# Patient Record
Sex: FEMALE | Race: BLACK OR AFRICAN AMERICAN | NOT HISPANIC OR LATINO | URBAN - METROPOLITAN AREA
[De-identification: names, ages, dates, MRNs, and addresses within clinical notes are randomized per-mention and may not be internally consistent; named-entity substitution may affect disease eponyms.]

---

## 2023-11-10 ENCOUNTER — OFFICE (OUTPATIENT)
Dept: URBAN - METROPOLITAN AREA CLINIC 11 | Facility: CLINIC | Age: 68
End: 2023-11-10

## 2023-11-10 VITALS
DIASTOLIC BLOOD PRESSURE: 95 MMHG | SYSTOLIC BLOOD PRESSURE: 157 MMHG | OXYGEN SATURATION: 92 % | WEIGHT: 174 LBS | HEIGHT: 66 IN | HEART RATE: 82 BPM

## 2023-11-10 DIAGNOSIS — Z80.0 FAMILY HISTORY OF MALIGNANT NEOPLASM OF DIGESTIVE ORGANS: ICD-10-CM

## 2023-11-10 DIAGNOSIS — K21.9 GASTRO-ESOPHAGEAL REFLUX DISEASE WITHOUT ESOPHAGITIS: ICD-10-CM

## 2023-11-10 DIAGNOSIS — K59.00 CONSTIPATION, UNSPECIFIED: ICD-10-CM

## 2023-11-10 DIAGNOSIS — Z86.010 PERSONAL HISTORY OF COLONIC POLYPS: ICD-10-CM

## 2023-11-10 DIAGNOSIS — K92.1 MELENA: ICD-10-CM

## 2023-11-10 PROCEDURE — 99204 OFFICE O/P NEW MOD 45 MIN: CPT | Performed by: STUDENT IN AN ORGANIZED HEALTH CARE EDUCATION/TRAINING PROGRAM

## 2023-11-10 RX ORDER — SODIUM PICOSULFATE, MAGNESIUM OXIDE, AND ANHYDROUS CITRIC ACID 10; 3.5; 12 MG/160ML; G/160ML; G/160ML
LIQUID ORAL
Qty: 350 | Refills: 0 | Status: COMPLETED
Start: 2023-11-10 | End: 2023-11-21

## 2023-11-10 NOTE — SERVICEHPINOTES
Ms. Aishwarya Freeman is a 68-year-old female with a past medical history of hypertension, GERD, neuropathy, who presents to gastro 1 for hematochezia.
br
br   clinic visit 11/10/2023: 
br Patient reports that she has been struggling with worsening constipation lately with associated straining.  She had an episode of hematochezia after she gave herself an enema 2 weeks ago and had significant passage of oscar blood.  She normally has 1 bowel movement every 3 days but she has recently started taking docusate which does help  soften her stools.  When she is not having regular bowel movements he has associated bloating.  She takes omeprazole once a day for the last several years with good control of her acid reflux.  She is not taking any NSAIDs.  Her last colonoscopy was in 2013 and 1 tubular adenoma and sessile serrated adenoma were removed and repeat was recommended in 5 years but she has been lost to follow-up.  She has a family history of recently diagnosed colon cancer in her brother at age 64 and her sister recently found some precancerous polyps.  She does not smoke, drink alcohol, or use recreational drugs.  Previous abdominal surgeries include hysterectomy and myomectomy.

## 2023-11-10 NOTE — SERVICENOTES
Patient has new onset hematochezia for the last couple of weeks with associated constipation and straining so it is likely that she may have symptomatic hemorrhoids but we need to rule out other malignant causes since she is overdue for colonoscopy.  Will schedule her for diagnostic colonoscopy.  I encouraged her to increase the fiber in her diet and start MiraLax once a day.  She can continue taking docusate over-the-counter.  I discussed with her the risks and benefits of the procedure including bleeding, infection, anesthesia related complications.  Patient agrees proceed with the planned procedure.  All questions addressed.  She can continue taking omeprazole once a day with good control of her acid reflux.  Avoid NSAIDs.  All questions addressed. I personally reviewed the patient's previous medical records for her visit today.

## 2023-11-21 ENCOUNTER — OFFICE (OUTPATIENT)
Dept: URBAN - METROPOLITAN AREA PATHOLOGY 12 | Facility: PATHOLOGY | Age: 68
End: 2023-11-21
Payer: MEDICARE

## 2023-11-21 ENCOUNTER — AMBULATORY SURGICAL CENTER (OUTPATIENT)
Dept: URBAN - METROPOLITAN AREA SURGERY 2 | Facility: SURGERY | Age: 68
End: 2023-11-21
Payer: MEDICARE

## 2023-11-21 VITALS
WEIGHT: 173 LBS | DIASTOLIC BLOOD PRESSURE: 93 MMHG | DIASTOLIC BLOOD PRESSURE: 64 MMHG | DIASTOLIC BLOOD PRESSURE: 70 MMHG | HEART RATE: 62 BPM | OXYGEN SATURATION: 100 % | SYSTOLIC BLOOD PRESSURE: 125 MMHG | HEART RATE: 72 BPM | SYSTOLIC BLOOD PRESSURE: 125 MMHG | HEIGHT: 66 IN | HEART RATE: 82 BPM | TEMPERATURE: 98.2 F | SYSTOLIC BLOOD PRESSURE: 136 MMHG | DIASTOLIC BLOOD PRESSURE: 70 MMHG | HEART RATE: 72 BPM | TEMPERATURE: 98.3 F | SYSTOLIC BLOOD PRESSURE: 158 MMHG | DIASTOLIC BLOOD PRESSURE: 64 MMHG | OXYGEN SATURATION: 99 % | DIASTOLIC BLOOD PRESSURE: 64 MMHG | OXYGEN SATURATION: 99 % | SYSTOLIC BLOOD PRESSURE: 119 MMHG | HEART RATE: 62 BPM | HEART RATE: 82 BPM | RESPIRATION RATE: 16 BRPM | SYSTOLIC BLOOD PRESSURE: 125 MMHG | SYSTOLIC BLOOD PRESSURE: 119 MMHG | RESPIRATION RATE: 16 BRPM | OXYGEN SATURATION: 98 % | SYSTOLIC BLOOD PRESSURE: 158 MMHG | WEIGHT: 173 LBS | HEIGHT: 66 IN | TEMPERATURE: 98.3 F | DIASTOLIC BLOOD PRESSURE: 73 MMHG | DIASTOLIC BLOOD PRESSURE: 73 MMHG | DIASTOLIC BLOOD PRESSURE: 68 MMHG | SYSTOLIC BLOOD PRESSURE: 139 MMHG | RESPIRATION RATE: 16 BRPM | HEART RATE: 61 BPM | HEART RATE: 61 BPM | HEIGHT: 66 IN | DIASTOLIC BLOOD PRESSURE: 93 MMHG | DIASTOLIC BLOOD PRESSURE: 93 MMHG | OXYGEN SATURATION: 99 % | DIASTOLIC BLOOD PRESSURE: 73 MMHG | TEMPERATURE: 98.2 F | TEMPERATURE: 98.3 F | HEART RATE: 72 BPM | OXYGEN SATURATION: 98 % | HEART RATE: 61 BPM | WEIGHT: 173 LBS | OXYGEN SATURATION: 98 % | SYSTOLIC BLOOD PRESSURE: 136 MMHG | SYSTOLIC BLOOD PRESSURE: 139 MMHG | SYSTOLIC BLOOD PRESSURE: 139 MMHG | SYSTOLIC BLOOD PRESSURE: 119 MMHG | OXYGEN SATURATION: 100 % | OXYGEN SATURATION: 100 % | SYSTOLIC BLOOD PRESSURE: 158 MMHG | SYSTOLIC BLOOD PRESSURE: 136 MMHG | HEART RATE: 62 BPM | DIASTOLIC BLOOD PRESSURE: 68 MMHG | DIASTOLIC BLOOD PRESSURE: 70 MMHG | HEART RATE: 82 BPM | TEMPERATURE: 98.2 F | DIASTOLIC BLOOD PRESSURE: 68 MMHG

## 2023-11-21 DIAGNOSIS — K63.5 POLYP OF COLON: ICD-10-CM

## 2023-11-21 DIAGNOSIS — D12.5 BENIGN NEOPLASM OF SIGMOID COLON: ICD-10-CM

## 2023-11-21 DIAGNOSIS — K57.30 DIVERTICULOSIS OF LARGE INTESTINE WITHOUT PERFORATION OR ABS: ICD-10-CM

## 2023-11-21 DIAGNOSIS — D12.3 BENIGN NEOPLASM OF TRANSVERSE COLON: ICD-10-CM

## 2023-11-21 DIAGNOSIS — D12.2 BENIGN NEOPLASM OF ASCENDING COLON: ICD-10-CM

## 2023-11-21 DIAGNOSIS — K92.1 MELENA: ICD-10-CM

## 2023-11-21 PROCEDURE — 88305 TISSUE EXAM BY PATHOLOGIST: CPT | Mod: TC | Performed by: STUDENT IN AN ORGANIZED HEALTH CARE EDUCATION/TRAINING PROGRAM

## 2023-11-21 PROCEDURE — 45385 COLONOSCOPY W/LESION REMOVAL: CPT | Performed by: STUDENT IN AN ORGANIZED HEALTH CARE EDUCATION/TRAINING PROGRAM

## 2023-11-21 PROCEDURE — 45380 COLONOSCOPY AND BIOPSY: CPT | Mod: 59 | Performed by: STUDENT IN AN ORGANIZED HEALTH CARE EDUCATION/TRAINING PROGRAM

## 2023-11-21 PROCEDURE — 45381 COLONOSCOPY SUBMUCOUS NJX: CPT | Mod: 51 | Performed by: STUDENT IN AN ORGANIZED HEALTH CARE EDUCATION/TRAINING PROGRAM

## 2023-11-21 NOTE — SERVICEHPINOTES
Ms. Aishwarya Freeman is a 68-year-old female with a past medical history of hypertension, GERD, neuropathy, who initially presented to gastro  for hematochezia.clinic visit 11/10/2023:brPatient reports that she has been struggling with worsening constipation lately with associated straining.  She had an episode of hematochezia after she gave herself an enema 2 weeks ago and had significant passage of oscar blood.  She normally has 1 bowel movement every 3 days but she has recently started taking docusate which does help  soften her stools.  When she is not having regular bowel movements he has associated bloating.  She takes omeprazole once a day for the last several years with good control of her acid reflux.  She is not taking any NSAIDs.  Her last colonoscopy was in 2013 and 1 tubular adenoma and sessile serrated adenoma were removed and repeat was recommended in 5 years but she has been lost to follow-up.  She has a family history of recently diagnosed colon cancer in her brother at age 64 and her sister recently found some precancerous polyps.  She does not smoke, drink alcohol, or use recreational drugs.  Previous abdominal surgeries include hysterectomy and myomectomy. 
br
br   Colonoscopy 11/21/23:  
br
Not on blood thinners, no further bleeding, brother has colon cancer. Ready for procedure today.

## 2023-11-21 NOTE — SERVICENOTES
scope in 6:50
cecum 7:02
scope out 7:25
tortuous colon, multiple polyps, with piecemeal removal will likely need repeat colonoscopy in 6 months, bleeding could have been hemorrhoidal vs diverticulosis

## 2023-11-27 LAB
GASTRO ONE PATHOLOGY: PDF REPORT: (no result)

## 2024-02-14 ENCOUNTER — OFFICE (OUTPATIENT)
Dept: URBAN - METROPOLITAN AREA CLINIC 11 | Facility: CLINIC | Age: 69
End: 2024-02-14

## 2024-02-14 VITALS
OXYGEN SATURATION: 91 % | WEIGHT: 172 LBS | HEART RATE: 78 BPM | SYSTOLIC BLOOD PRESSURE: 149 MMHG | HEIGHT: 66 IN | DIASTOLIC BLOOD PRESSURE: 93 MMHG

## 2024-02-14 DIAGNOSIS — K59.00 CONSTIPATION, UNSPECIFIED: ICD-10-CM

## 2024-02-14 DIAGNOSIS — K21.9 GASTRO-ESOPHAGEAL REFLUX DISEASE WITHOUT ESOPHAGITIS: ICD-10-CM

## 2024-02-14 DIAGNOSIS — Z86.010 PERSONAL HISTORY OF COLONIC POLYPS: ICD-10-CM

## 2024-02-14 PROCEDURE — 99214 OFFICE O/P EST MOD 30 MIN: CPT | Performed by: STUDENT IN AN ORGANIZED HEALTH CARE EDUCATION/TRAINING PROGRAM

## 2024-02-14 RX ORDER — LINACLOTIDE 145 UG/1
CAPSULE, GELATIN COATED ORAL
Qty: 90 | Refills: 3 | Status: COMPLETED
Start: 2024-02-14 | End: 2024-03-26

## 2024-02-14 RX ORDER — SODIUM PICOSULFATE, MAGNESIUM OXIDE, AND ANHYDROUS CITRIC ACID 10; 3.5; 12 MG/160ML; G/160ML; G/160ML
LIQUID ORAL
Qty: 350 | Refills: 0 | Status: COMPLETED
Start: 2024-02-14 | End: 2024-03-26

## 2024-02-14 NOTE — SERVICENOTES
Patient needs a repeat colonoscopy due to piecemeal resection of a large polyp on last exam so will go ahead and schedule her for that at this time.  Will continue omeprazole once a day for her acid reflux.  For her increased bloating and constipation despite over-the-counter measures I am going to start her on Linzess, samples given today.  Encourage high-fiber diet and will have her return to clinic after her procedure to see how the Linzess is working.  I discussed with the patient the risks and benefits of the procedure including bleeding, infection, anesthesia related complications.  Patient agrees to proceed with the planned procedure.  All questions addressed.

## 2024-02-14 NOTE — SERVICEHPINOTES
Ms. Aishwarya Freeman is a 68-year-old female with a past medical history of hypertension, GERD, neuropathy, who initially presented to gastro  for hematochezia.clinic visit 11/10/2023:brPatient reports that she has been struggling with worsening constipation lately with associated straining.  She had an episode of hematochezia after she gave herself an enema 2 weeks ago and had significant passage of oscar blood.  She normally has 1 bowel movement every 3 days but she has recently started taking docusate which does help  soften her stools.  When she is not having regular bowel movements he has associated bloating.  She takes omeprazole once a day for the last several years with good control of her acid reflux.  She is not taking any NSAIDs.  Her last colonoscopy was in  and 1 tubular adenoma and sessile serrated adenoma were removed and repeat was recommended in 5 years but she has been lost to follow-up.  She has a family history of recently diagnosed colon cancer in her brother at age 64 and her sister recently found some precancerous polyps.  She does not smoke, drink alcohol, or use recreational drugs.  Previous abdominal surgeries include hysterectomy and myomectomy.Colonoscopy 23:brNot on blood thinners, no further bleeding, brother has colon cancer. Ready for procedure today.br  
br Clinic visit 24:
br 
 since her colonoscopy the patient has had no more hematochezia but she still has some straining and bloating.  She is taking stool softeners over-the-counter twice a day as well as MiraLax but is still having about 1 bowel movement about once every 3 days.  She is on some narcotic medications just because she had a tooth pulled recently but she does not anticipate taking any more medication for pain since she is far out from her procedure.  She has does take Pregabalin and omeprazole.  She has good control of her acid reflux.  Her colonoscopy showed 3 small tubular adenomas and 1 large sessile serrated adenoma that was removed piecemeal in the ascending colon so repeat was recommended in 6 months.   Patient reports that since last visit her brother  of colon cancer.  He lived in [place] and was a former professional .

## 2024-02-16 LAB
CELIAC AB TTG DGP TIGA: DEAMIDATED GLIADIN ABS, IGA: 6 UNITS (ref 0–19)
CELIAC AB TTG DGP TIGA: DEAMIDATED GLIADIN ABS, IGG: 3 UNITS (ref 0–19)
CELIAC AB TTG DGP TIGA: IMMUNOGLOBULIN A, QN, SERUM: 153 MG/DL (ref 87–352)
CELIAC AB TTG DGP TIGA: T-TRANSGLUTAMINASE (TTG) IGA: <2 U/ML
CELIAC AB TTG DGP TIGA: T-TRANSGLUTAMINASE (TTG) IGG: 42 U/ML — HIGH (ref 0–5)
TSH: 2.04 UIU/ML (ref 0.45–4.5)

## 2024-03-26 ENCOUNTER — AMBULATORY SURGICAL CENTER (OUTPATIENT)
Dept: URBAN - METROPOLITAN AREA SURGERY 2 | Facility: SURGERY | Age: 69
End: 2024-03-26
Payer: MEDICARE

## 2024-03-26 ENCOUNTER — OFFICE (OUTPATIENT)
Dept: URBAN - METROPOLITAN AREA PATHOLOGY 12 | Facility: PATHOLOGY | Age: 69
End: 2024-03-26
Payer: MEDICARE

## 2024-03-26 VITALS
HEART RATE: 75 BPM | OXYGEN SATURATION: 99 % | TEMPERATURE: 98.3 F | DIASTOLIC BLOOD PRESSURE: 61 MMHG | HEIGHT: 66 IN | RESPIRATION RATE: 12 BRPM | SYSTOLIC BLOOD PRESSURE: 106 MMHG | WEIGHT: 168 LBS | HEART RATE: 77 BPM | HEART RATE: 74 BPM | SYSTOLIC BLOOD PRESSURE: 102 MMHG | SYSTOLIC BLOOD PRESSURE: 106 MMHG | SYSTOLIC BLOOD PRESSURE: 111 MMHG | HEIGHT: 66 IN | OXYGEN SATURATION: 95 % | RESPIRATION RATE: 16 BRPM | DIASTOLIC BLOOD PRESSURE: 61 MMHG | TEMPERATURE: 98.3 F | TEMPERATURE: 98.2 F | OXYGEN SATURATION: 100 % | HEART RATE: 75 BPM | RESPIRATION RATE: 14 BRPM | SYSTOLIC BLOOD PRESSURE: 129 MMHG | DIASTOLIC BLOOD PRESSURE: 58 MMHG | DIASTOLIC BLOOD PRESSURE: 60 MMHG | DIASTOLIC BLOOD PRESSURE: 61 MMHG | OXYGEN SATURATION: 96 % | SYSTOLIC BLOOD PRESSURE: 129 MMHG | OXYGEN SATURATION: 95 % | RESPIRATION RATE: 14 BRPM | SYSTOLIC BLOOD PRESSURE: 106 MMHG | RESPIRATION RATE: 15 BRPM | WEIGHT: 168 LBS | DIASTOLIC BLOOD PRESSURE: 89 MMHG | HEART RATE: 74 BPM | HEART RATE: 71 BPM | DIASTOLIC BLOOD PRESSURE: 58 MMHG | OXYGEN SATURATION: 94 % | SYSTOLIC BLOOD PRESSURE: 111 MMHG | OXYGEN SATURATION: 96 % | OXYGEN SATURATION: 100 % | HEART RATE: 77 BPM | HEART RATE: 75 BPM | DIASTOLIC BLOOD PRESSURE: 89 MMHG | RESPIRATION RATE: 12 BRPM | OXYGEN SATURATION: 94 % | DIASTOLIC BLOOD PRESSURE: 62 MMHG | DIASTOLIC BLOOD PRESSURE: 62 MMHG | DIASTOLIC BLOOD PRESSURE: 60 MMHG | TEMPERATURE: 98.2 F | RESPIRATION RATE: 16 BRPM | DIASTOLIC BLOOD PRESSURE: 89 MMHG | DIASTOLIC BLOOD PRESSURE: 62 MMHG | SYSTOLIC BLOOD PRESSURE: 129 MMHG | RESPIRATION RATE: 14 BRPM | HEART RATE: 74 BPM | HEART RATE: 71 BPM | OXYGEN SATURATION: 99 % | DIASTOLIC BLOOD PRESSURE: 58 MMHG | SYSTOLIC BLOOD PRESSURE: 102 MMHG | DIASTOLIC BLOOD PRESSURE: 60 MMHG | RESPIRATION RATE: 12 BRPM | RESPIRATION RATE: 15 BRPM | RESPIRATION RATE: 15 BRPM | OXYGEN SATURATION: 99 % | OXYGEN SATURATION: 95 % | TEMPERATURE: 98.3 F | WEIGHT: 168 LBS | HEART RATE: 71 BPM | OXYGEN SATURATION: 96 % | SYSTOLIC BLOOD PRESSURE: 102 MMHG | OXYGEN SATURATION: 94 % | RESPIRATION RATE: 16 BRPM | HEART RATE: 77 BPM | OXYGEN SATURATION: 100 % | SYSTOLIC BLOOD PRESSURE: 111 MMHG | HEIGHT: 66 IN | TEMPERATURE: 98.2 F

## 2024-03-26 DIAGNOSIS — Z80.0 FAMILY HISTORY OF MALIGNANT NEOPLASM OF DIGESTIVE ORGANS: ICD-10-CM

## 2024-03-26 DIAGNOSIS — R89.4 ABNORMAL IMMUNOLOGICAL FINDINGS IN SPECIMENS FROM OTHER ORGA: ICD-10-CM

## 2024-03-26 DIAGNOSIS — K31.7 POLYP OF STOMACH AND DUODENUM: ICD-10-CM

## 2024-03-26 DIAGNOSIS — Z09 ENCOUNTER FOR FOLLOW-UP EXAMINATION AFTER COMPLETED TREATMEN: ICD-10-CM

## 2024-03-26 DIAGNOSIS — D12.2 BENIGN NEOPLASM OF ASCENDING COLON: ICD-10-CM

## 2024-03-26 DIAGNOSIS — K57.30 DIVERTICULOSIS OF LARGE INTESTINE WITHOUT PERFORATION OR ABS: ICD-10-CM

## 2024-03-26 DIAGNOSIS — K31.89 OTHER DISEASES OF STOMACH AND DUODENUM: ICD-10-CM

## 2024-03-26 DIAGNOSIS — K63.5 POLYP OF COLON: ICD-10-CM

## 2024-03-26 DIAGNOSIS — K21.9 GASTRO-ESOPHAGEAL REFLUX DISEASE WITHOUT ESOPHAGITIS: ICD-10-CM

## 2024-03-26 DIAGNOSIS — K44.9 DIAPHRAGMATIC HERNIA WITHOUT OBSTRUCTION OR GANGRENE: ICD-10-CM

## 2024-03-26 DIAGNOSIS — Z86.010 PERSONAL HISTORY OF COLONIC POLYPS: ICD-10-CM

## 2024-03-26 PROCEDURE — 88313 SPECIAL STAINS GROUP 2: CPT | Performed by: STUDENT IN AN ORGANIZED HEALTH CARE EDUCATION/TRAINING PROGRAM

## 2024-03-26 PROCEDURE — 45380 COLONOSCOPY AND BIOPSY: CPT | Performed by: STUDENT IN AN ORGANIZED HEALTH CARE EDUCATION/TRAINING PROGRAM

## 2024-03-26 PROCEDURE — 88305 TISSUE EXAM BY PATHOLOGIST: CPT | Performed by: STUDENT IN AN ORGANIZED HEALTH CARE EDUCATION/TRAINING PROGRAM

## 2024-03-26 PROCEDURE — 88341 IMHCHEM/IMCYTCHM EA ADD ANTB: CPT | Performed by: STUDENT IN AN ORGANIZED HEALTH CARE EDUCATION/TRAINING PROGRAM

## 2024-03-26 PROCEDURE — 43239 EGD BIOPSY SINGLE/MULTIPLE: CPT | Mod: 51 | Performed by: STUDENT IN AN ORGANIZED HEALTH CARE EDUCATION/TRAINING PROGRAM

## 2024-03-26 PROCEDURE — 88342 IMHCHEM/IMCYTCHM 1ST ANTB: CPT | Performed by: STUDENT IN AN ORGANIZED HEALTH CARE EDUCATION/TRAINING PROGRAM

## 2024-03-26 NOTE — SERVICEHPINOTES
Ms. Aishwarya Freeman is a 69-year-old female with a past medical history of hypertension, GERD, neuropathy, who initially presented to [place] [id number] for hematochezia.clinic visit 11/10/2023:brPatient reports that she has been struggling with worsening constipation lately with associated straining.  She had an episode of hematochezia after she gave herself an enema 2 weeks ago and had significant passage of oscar blood.  She normally has 1 bowel movement every 3 days but she has recently started taking docusate which does help  soften her stools.  When she is not having regular bowel movements he has associated bloating.  She takes omeprazole once a day for the last several years with good control of her acid reflux.  She is not taking any NSAIDs.  Her last colonoscopy was in  and 1 tubular adenoma and sessile serrated adenoma were removed and repeat was recommended in 5 years but she has been lost to follow-up.  She has a family history of recently diagnosed colon cancer in her brother at age 64 and her sister recently found some precancerous polyps.  She does not smoke, drink alcohol, or use recreational drugs.  Previous abdominal surgeries include hysterectomy and myomectomy.Colonoscopy 23:brNot on blood thinners, no further bleeding, brother has colon cancer. Ready for procedure today.Clinic visit 24:br since her colonoscopy the patient has had no more hematochezia but she still has some straining and bloating.  She is taking stool softeners over-the-counter twice a day as well as MiraLax but is still having about 1 bowel movement about once every 3 days.  She is on some narcotic medications just because she had a tooth pulled recently but she does not anticipate taking any more medication for pain since she is far out from her procedure.  She has does take Pregabalin and omeprazole.  She has good control of her acid reflux.  Her colonoscopy showed 3 small tubular adenomas and 1 large sessile serrated adenoma that was removed piecemeal in the ascending colon so repeat was recommended in 6 months.   Patient reports that since last visit her brother  of colon cancer.  He lived in [place] and was a former professional .
br
br   EGD/colonoscopy 3/26/24:  
br
still has some bloating, celiac serologies equivocal, last BM was clear, not on blood thinners.

## 2024-03-26 NOTE — SERVICEHPINOTES
Ms. Aishwarya Freeman is a 69-year-old female with a past medical history of hypertension, GERD, neuropathy, who initially presented to [place] [address] for hematochezia.clinic visit 11/10/2023:brPatient reports that she has been struggling with worsening constipation lately with associated straining.  She had an episode of hematochezia after she gave herself an enema 2 weeks ago and had significant passage of oscar blood.  She normally has 1 bowel movement every 3 days but she has recently started taking docusate which does help  soften her stools.  When she is not having regular bowel movements he has associated bloating.  She takes omeprazole once a day for the last several years with good control of her acid reflux.  She is not taking any NSAIDs.  Her last colonoscopy was in  and 1 tubular adenoma and sessile serrated adenoma were removed and repeat was recommended in 5 years but she has been lost to follow-up.  She has a family history of recently diagnosed colon cancer in her brother at age 64 and her sister recently found some precancerous polyps.  She does not smoke, drink alcohol, or use recreational drugs.  Previous abdominal surgeries include hysterectomy and myomectomy.Colonoscopy 23:brNot on blood thinners, no further bleeding, brother has colon cancer. Ready for procedure today.Clinic visit 24:br since her colonoscopy the patient has had no more hematochezia but she still has some straining and bloating.  She is taking stool softeners over-the-counter twice a day as well as MiraLax but is still having about 1 bowel movement about once every 3 days.  She is on some narcotic medications just because she had a tooth pulled recently but she does not anticipate taking any more medication for pain since she is far out from her procedure.  She has does take Pregabalin and omeprazole.  She has good control of her acid reflux.  Her colonoscopy showed 3 small tubular adenomas and 1 large sessile serrated adenoma that was removed piecemeal in the ascending colon so repeat was recommended in 6 months.   Patient reports that since last visit her brother  of colon cancer.  He lived in [place] and was a former professional .
br
br   EGD/colonoscopy 3/26/24:  
br
still has some bloating, celiac serologies equivocal, last BM was clear, not on blood thinners.

## 2024-03-26 NOTE — SERVICEHPINOTES
Ms. Aishwarya Freeman is a 69-year-old female with a past medical history of hypertension, GERD, neuropathy, who initially presented to [place] [address] for hematochezia.clinic visit 11/10/2023:brPatient reports that she has been struggling with worsening constipation lately with associated straining.  She had an episode of hematochezia after she gave herself an enema 2 weeks ago and had significant passage of oscar blood.  She normally has 1 bowel movement every 3 days but she has recently started taking docusate which does help  soften her stools.  When she is not having regular bowel movements he has associated bloating.  She takes omeprazole once a day for the last several years with good control of her acid reflux.  She is not taking any NSAIDs.  Her last colonoscopy was in  and 1 tubular adenoma and sessile serrated adenoma were removed and repeat was recommended in 5 years but she has been lost to follow-up.  She has a family history of recently diagnosed colon cancer in her brother at age 64 and her sister recently found some precancerous polyps.  She does not smoke, drink alcohol, or use recreational drugs.  Previous abdominal surgeries include hysterectomy and myomectomy.Colonoscopy 23:brNot on blood thinners, no further bleeding, brother has colon cancer. Ready for procedure today.Clinic visit 24:br since her colonoscopy the patient has had no more hematochezia but she still has some straining and bloating.  She is taking stool softeners over-the-counter twice a day as well as MiraLax but is still having about 1 bowel movement about once every 3 days.  She is on some narcotic medications just because she had a tooth pulled recently but she does not anticipate taking any more medication for pain since she is far out from her procedure.  She has does take Pregabalin and omeprazole.  She has good control of her acid reflux.  Her colonoscopy showed 3 small tubular adenomas and 1 large sessile serrated adenoma that was removed piecemeal in the ascending colon so repeat was recommended in 6 months.   Patient reports that since last visit her brother  of colon cancer.  He lived in [place] and was a former professional .
br
br   EGD/colonoscopy 3/26/24:  
br
still has some bloating, celiac serologies equivocal, last BM was clear, not on blood thinners.

## 2024-03-26 NOTE — SERVICENOTES
scope in 7:30
cecum 7:39
scope out 7:51
tortuous colon, required external pressure
likely repeat in 3 years due to large polyp removed on last procedure

## 2024-03-28 LAB
GASTRO ONE PATHOLOGY: PDF REPORT: (no result)

## 2024-07-17 ENCOUNTER — OFFICE (OUTPATIENT)
Dept: URBAN - METROPOLITAN AREA CLINIC 11 | Facility: CLINIC | Age: 69
End: 2024-07-17

## 2024-07-17 VITALS
HEART RATE: 83 BPM | WEIGHT: 171 LBS | HEIGHT: 66 IN | DIASTOLIC BLOOD PRESSURE: 89 MMHG | SYSTOLIC BLOOD PRESSURE: 135 MMHG | OXYGEN SATURATION: 99 %

## 2024-07-17 DIAGNOSIS — K21.9 GASTRO-ESOPHAGEAL REFLUX DISEASE WITHOUT ESOPHAGITIS: ICD-10-CM

## 2024-07-17 DIAGNOSIS — K64.9 UNSPECIFIED HEMORRHOIDS: ICD-10-CM

## 2024-07-17 DIAGNOSIS — K58.1 IRRITABLE BOWEL SYNDROME WITH CONSTIPATION: ICD-10-CM

## 2024-07-17 PROCEDURE — 99214 OFFICE O/P EST MOD 30 MIN: CPT | Performed by: STUDENT IN AN ORGANIZED HEALTH CARE EDUCATION/TRAINING PROGRAM

## 2024-07-17 RX ORDER — LINACLOTIDE 290 UG/1
CAPSULE, GELATIN COATED ORAL
Qty: 90 | Refills: 3 | Status: ACTIVE
Start: 2024-07-17 | End: 2024-07-17

## 2024-07-17 RX ORDER — HYDROCORTISONE ACETATE 25 MG/1
SUPPOSITORY RECTAL
Qty: 14 | Refills: 1 | Status: COMPLETED
Start: 2024-07-17 | End: 2024-07-24

## 2024-07-17 NOTE — SERVICEHPINOTES
Ms. Aishwarya Freeman is a 68-year-old female with a past medical history of hypertension, GERD, neuropathy, who initially presented to gastro  for hematochezia.clinic visit 11/10/2023:brPatient reports that she has been struggling with worsening constipation lately with associated straining. She had an episode of hematochezia after she gave herself an enema 2 weeks ago and had significant passage of oscar blood. She normally has 1 bowel movement every 3 days but she has recently started taking docusate which does help soften her stools. When she is not having regular bowel movements he has associated bloating. She takes omeprazole once a day for the last several years with good control of her acid reflux. She is not taking any NSAIDs. Her last colonoscopy was in  and 1 tubular adenoma and sessile serrated adenoma were removed and repeat was recommended in 5 years but she has been lost to follow-up. She has a family history of recently diagnosed colon cancer in her brother at age 64 and her sister recently found some precancerous polyps. She does not smoke, drink alcohol, or use recreational drugs. Previous abdominal surgeries include hysterectomy and myomectomy.Colonoscopy 23:brNot on blood thinners, no further bleeding, brother has colon cancer. Ready for procedure today.Clinic visit 24:br since her colonoscopy the patient has had no more hematochezia but she still has some straining and bloating. She is taking stool softeners over-the-counter twice a day as well as MiraLax but is still having about 1 bowel movement about once every 3 days. She is on some narcotic medications just because she had a tooth pulled recently but she does not anticipate taking any more medication for pain since she is far out from her procedure. She has does take Pregabalin and omeprazole. She has good control of her acid reflux. Her colonoscopy showed 3 small tubular adenomas and 1 large sessile serrated adenoma that was removed piecemeal in the ascending colon so repeat was recommended in 6 months.  Patient reports that since last visit her brother  of colon cancer. He lived in [place] and was a former professional .
br
br  EGD/colonoscopy 2024:
brReactive gastropathy, reactive foveolar hyperplastic, TA, hiatal hernia, diverticulosis, and internal hemorrhoids.  Recall in 3 years
br br Clinic Visit 24:
marceloMs. Freeman reports that Linzess 145 mcg once a day is not working as well for her.  She still reports bloating, difficulty passing stools, and abdominal cramping.  Patient reports she is having a bowel movement every 2-3 days that require straining.  Prior to having a bowel movement, she will experience some generalized abdominal cramping.  Sometimes it is associated with nausea.  However, she denies vomiting.  The cramping and nausea resolved after having a bowel movement.  Sometimes if she has not had a bowel movement after the 3rd day, she takes stool softeners.  The next day she will have a bowel movement.  She reports she feels as though her internal hemorrhoids are swelling and making it more difficult to pass stool.  She denies any hematochezia or melena.  She also states she has associated lower back pain that comes with hemorrhoidal pain.  She denies any itching or burning.   She reports she has been using preparation H ointment and tucks pads to help with her hemorrhoids.  Patient reports she is taking fiber supplements but needs to increase her water intake.  She reports when she drinks water she has a lot of urinary urgency.    br
br She states her GERD is controlled with omeprazole but does report some gas buildup in her chest.  She notices it when she drinks liquids only and states it feels as though a bubble has developed there.  She may take an extra omeprazole or a tbsp of baking soda.   She reports it is not frequent and may happen a few times a month. She denies any dysphagia.

## 2024-07-17 NOTE — SERVICENOTES
Patient has some hemorrhoidal discomfort so we will give her suppositories.  She has had some success with the Linzess so far but I think she would be better served with increasing her dose.  Increase fiber and drink water.  Increase exercise.  Follow up in 6 months or sooner if needed.  We will provide her with some samples today and get her plugged into patient assistance for [facility].   I personally reviewed her previous medical records for her visit today.

## 2025-01-02 ENCOUNTER — OFFICE (OUTPATIENT)
Dept: URBAN - METROPOLITAN AREA CLINIC 9 | Facility: CLINIC | Age: 70
End: 2025-01-02
Payer: MEDICARE

## 2025-01-02 VITALS
HEART RATE: 81 BPM | SYSTOLIC BLOOD PRESSURE: 169 MMHG | DIASTOLIC BLOOD PRESSURE: 97 MMHG | HEIGHT: 66 IN | WEIGHT: 175 LBS | OXYGEN SATURATION: 93 %

## 2025-01-02 DIAGNOSIS — K58.1 IRRITABLE BOWEL SYNDROME WITH CONSTIPATION: ICD-10-CM

## 2025-01-02 DIAGNOSIS — K21.9 GASTRO-ESOPHAGEAL REFLUX DISEASE WITHOUT ESOPHAGITIS: ICD-10-CM

## 2025-01-02 DIAGNOSIS — M62.89 OTHER SPECIFIED DISORDERS OF MUSCLE: ICD-10-CM

## 2025-01-02 DIAGNOSIS — R14.0 ABDOMINAL DISTENSION (GASEOUS): ICD-10-CM

## 2025-01-02 DIAGNOSIS — Z80.0 FAMILY HISTORY OF MALIGNANT NEOPLASM OF DIGESTIVE ORGANS: ICD-10-CM

## 2025-01-02 PROCEDURE — 99214 OFFICE O/P EST MOD 30 MIN: CPT | Performed by: STUDENT IN AN ORGANIZED HEALTH CARE EDUCATION/TRAINING PROGRAM

## 2025-01-02 NOTE — SERVICENOTES
The patient has regular bowel movements when on the Linzess 290 mcg however she does have occasions where she feels increased bloating and feels the urgency to have a bowel movement but then she is not able to expel her stool when she sits in the toilet.  I think there may be a component of pelvic floor dyssynergia so I am going to refer her for physical therapy.  Continue Linzess and we will give her samples today and will follow-up on the documentation to ensure she gets and rolled the patient assistance program.  I personally reviewed her previous medical records for her visit today.  Recall colonoscopy is set.  Continue omeprazole at current dose.

## 2025-01-02 NOTE — SERVICEHPINOTES
Ms. Aishwarya Freeman is a 69-year-old female with a past medical history of hypertension, GERD, neuropathy, who initially presented to gastro  for hematochezia.clinic visit 11/10/2023:brPatient reports that she has been struggling with worsening constipation lately with associated straining. She had an episode of hematochezia after she gave herself an enema 2 weeks ago and had significant passage of oscar blood. She normally has 1 bowel movement every 3 days but she has recently started taking docusate which does help soften her stools. When she is not having regular bowel movements he has associated bloating. She takes omeprazole once a day for the last several years with good control of her acid reflux. She is not taking any NSAIDs. Her last colonoscopy was in  and 1 tubular adenoma and sessile serrated adenoma were removed and repeat was recommended in 5 years but she has been lost to follow-up. She has a family history of recently diagnosed colon cancer in her brother at age 64 and her sister recently found some precancerous polyps. She does not smoke, drink alcohol, or use recreational drugs. Previous abdominal surgeries include hysterectomy and myomectomy.Colonoscopy 23:brNot on blood thinners, no further bleeding, brother has colon cancer. Ready for procedure today.Clinic visit 24:brsince her colonoscopy the patient has had no more hematochezia but she still has some straining and bloating. She is taking stool softeners over-the-counter twice a day as well as MiraLax but is still having about 1 bowel movement about once every 3 days. She is on some narcotic medications just because she had a tooth pulled recently but she does not anticipate taking any more medication for pain since she is far out from her procedure. She has does take Pregabalin and omeprazole. She has good control of her acid reflux. Her colonoscopy showed 3 small tubular adenomas and 1 large sessile serrated adenoma that was removed piecemeal in the ascending colon so repeat was recommended in 6 months. Patient reports that since last visit her brother  of colon cancer. He lived in [place] and was a former professional .EGD/colonoscopy 2024:brReactive gastropathy, reactive foveolar hyperplastic, TA, hiatal hernia, diverticulosis, and internal hemorrhoids.  Recall in 3 yearsClinic Visit 24:Dallin Freeman reports that Linzess 145 mcg once a day is not working as well for her.  She still reports bloating, difficulty passing stools, and abdominal cramping.  Patient reports she is having a bowel movement every 2-3 days that require straining.  Prior to having a bowel movement, she will experience some generalized abdominal cramping.  Sometimes it is associated with nausea.  However, she denies vomiting.  The cramping and nausea resolved after having a bowel movement.  Sometimes if she has not had a bowel movement after the 3rd day, she takes stool softeners.  The next day she will have a bowel movement.  She reports she feels as though her internal hemorrhoids are swelling and making it more difficult to pass stool.  She denies any hematochezia or melena.  She also states she has associated lower back pain that comes with hemorrhoidal pain.  She denies any itching or burning.   She reports she has been using preparation H ointment and tucks pads to help with her hemorrhoids.  Patient reports she is taking fiber supplements but needs to increase her water intake.  She reports when she drinks water she has a lot of urinary urgency.    She states her GERD is controlled with omeprazole but does report some gas buildup in her chest.  She notices it when she drinks liquids only and states it feels as though a bubble has developed there.  She may take an extra omeprazole or a tbsp of baking soda.   She reports it is not frequent and may happen a few times a month. She denies any dysphagia.
br
br      Clinic visit 2025:
br 
 patient reports that she takes omeprazole with good results.  She currently takes Linzess 290 mcg which does help her have more regular bowel movements.  However she has episodes where she feels like she gets to the toilet and feels the need to have a bowel movement with a lot of bloating but then feels like it is blocked by something.  She had a good colonoscopy earlier last year.  She did have some hemorrhoids but they were not large at the time of her procedure and she is using cream to help with the hemorrhoids currently.  She has a history of 1 vaginal delivery in the past and she thinks she may have had to have some stitching at that time.  She is working on getting enrolled to the patient assistance program with Linzess due to her irritable bowel syndrome with constipation.marcelo

## 2025-03-31 ENCOUNTER — OFFICE (OUTPATIENT)
Dept: URBAN - METROPOLITAN AREA CLINIC 11 | Facility: CLINIC | Age: 70
End: 2025-03-31
Payer: MEDICARE

## 2025-03-31 DIAGNOSIS — K64.9 UNSPECIFIED HEMORRHOIDS: ICD-10-CM

## 2025-03-31 DIAGNOSIS — K21.9 GASTRO-ESOPHAGEAL REFLUX DISEASE WITHOUT ESOPHAGITIS: ICD-10-CM

## 2025-03-31 DIAGNOSIS — K58.1 IRRITABLE BOWEL SYNDROME WITH CONSTIPATION: ICD-10-CM

## 2025-03-31 PROCEDURE — 99490 CHRNC CARE MGMT STAFF 1ST 20: CPT | Performed by: STUDENT IN AN ORGANIZED HEALTH CARE EDUCATION/TRAINING PROGRAM

## 2025-03-31 PROCEDURE — 99439 CHRNC CARE MGMT STAF EA ADDL: CPT | Performed by: STUDENT IN AN ORGANIZED HEALTH CARE EDUCATION/TRAINING PROGRAM
